# Patient Record
Sex: FEMALE | Race: WHITE | NOT HISPANIC OR LATINO | URBAN - METROPOLITAN AREA
[De-identification: names, ages, dates, MRNs, and addresses within clinical notes are randomized per-mention and may not be internally consistent; named-entity substitution may affect disease eponyms.]

---

## 2017-11-14 ENCOUNTER — EMERGENCY (EMERGENCY)
Facility: HOSPITAL | Age: 27
LOS: 1 days | Discharge: HOME | End: 2017-11-14
Attending: EMERGENCY MEDICINE | Admitting: EMERGENCY MEDICINE
Payer: COMMERCIAL

## 2017-11-14 VITALS
TEMPERATURE: 98 F | WEIGHT: 123.02 LBS | HEART RATE: 98 BPM | RESPIRATION RATE: 18 BRPM | DIASTOLIC BLOOD PRESSURE: 69 MMHG | SYSTOLIC BLOOD PRESSURE: 109 MMHG | OXYGEN SATURATION: 97 %

## 2017-11-14 DIAGNOSIS — L50.9 URTICARIA, UNSPECIFIED: ICD-10-CM

## 2017-11-14 PROCEDURE — 99283 EMERGENCY DEPT VISIT LOW MDM: CPT

## 2017-11-14 PROCEDURE — 99283 EMERGENCY DEPT VISIT LOW MDM: CPT | Mod: 25

## 2017-11-14 RX ADMIN — Medication 50 MILLIGRAM(S): at 01:32

## 2017-11-14 NOTE — ED ADULT TRIAGE NOTE - ARRIVAL INFO ADDITIONAL COMMENTS
pt c/o itching to body, "I saw welts on my thighs" started this afternoon. denies lip or tongue itching or swelling, denies chest pain, sob, dizziness . states "I think i'm allergic to a hat that my mom knit"

## 2017-11-14 NOTE — ED PROVIDER NOTE - OBJECTIVE STATEMENT
27 F co hives- diffuse hives to entire body- started earlier today- no known exposures  was eating a variety of food at an event- no sob/wheezing  3 benadryl taken pta w partial improvement  no exac factors  mild-moderate severity